# Patient Record
(demographics unavailable — no encounter records)

---

## 2018-12-02 NOTE — EKG
[place] [address]
Phone:  [phone]                     ELECTROCARDIOGRAM REPORT      
_______________________________________________________________________________
 
Name:       MARADIAGASHUEE               Room:                      [facility]#:  [id number]      Account #:      [id number]  
Admission:  18     Attend Phys:                         
Discharge:  18     Date of Birth:  78  
         Report #: [id number]
    [id number]
_______________________________________________________________________________
THIS REPORT FOR:  //name//                      
 
                         [facility] ED
                                       
Test Date:    2018               Test Time:    13:58:43
Pat Name:     SHU MARADIAGA                Department:   
Patient ID:   [id number]            Room:          
Gender:       F                        Technician:   [place]
:          1978               Requested By: Celia Miramontes
Order Number: [id number] MD:   Andre Gloria
                                 Measurements
Intervals                              Axis          
Rate:         83                       P:            27
ME:           150                      QRS:          18
QRSD:         94                       T:            -1
QT:           363                                    
QTc:          427                                    
                           Interpretive Statements
Sinus rhythm
Borderline T wave abnormalities
No previous ECG available for comparison
 
Electronically Signed On 2018 15:32:43 CST by Andre Gloria
[email or web address]
 
 
 
 
 
 
 
 
 
 
 
 
 
 
 
 
 
 
 
  <ELECTRONICALLY SIGNED>
                                           By: Andre Gloria MD, [facility]   
  18     1532
D: 18 1358   _____________________________________
T: 18 1358   Andre Gloria MD, FACC     /EPI

## 2021-01-02 NOTE — EKG
[place] [address]
Phone:  [phone]                     ELECTROCARDIOGRAM REPORT      
_______________________________________________________________________________
 
Name:         MARADIAGASHU ELY              Room:                     [facility]#:    [id number]     Account #:     [id number]  
Admission:    21    Attend Phys:                     
Discharge:    21    Date of Birth: 78  
Date of Service: 21  Report #:      [id number]
        [id number]
_______________________________________________________________________________
THIS REPORT FOR:  //name//                      
 
                         [facility] ED
                                       
Test Date:    2021               Test Time:    21:12:44
Pat Name:     HSU MARADIAGA                Department:   
Patient ID:   [id number]            Room:          
Gender:       F                        Technician:   
:          1978               Requested By: Renetta Rose
Order Number: [id number] MD:   Saroj Spencer
                                 Measurements
Intervals                              Axis          
Rate:         124                      P:            51
AK:           125                      QRS:          44
QRSD:         91                       T:            30
QT:           447                                    
QTc:          643                                    
                           Interpretive Statements
Sinus tachycardia
nonspecific t wave changes
Prolonged QT interval
Compared to ECG 2018 13:58:43
Prolonged QT interval now present
Sinus rhythm no longer present
Electronically Signed On 2021 9:42:15 CST by Saroj Spencer
[email or web address]
 
 
 
 
 
 
 
 
 
 
 
 
 
 
 
 
 
 
  <ELECTRONICALLY SIGNED>
                                           By: Saroj Spencer MD, FAC      
  21     0942
D: 21   _____________________________________
T: 21   Saroj Spencer MD, [facility]        /EPI

## 2023-06-13 NOTE — EKG
[place] [address]
Phone:  [phone]                     ELECTROCARDIOGRAM REPORT      
_______________________________________________________________________________
 
Name:         HIRENSHU ELY              Room:                     [facility]#:    [id number]     Account #:     [id number]  
Admission:    21    Attend Phys:                     
Discharge:    21    Date of Birth: 78  
Date of Service: 21  Report #:      [id number]
        [id number]
_______________________________________________________________________________
THIS REPORT FOR:  //name//                      
 
                         [facility] ED
                                       
Test Date:    2021               Test Time:    19:53:33
Pat Name:     SHU MARADIAGA                Department:   
Patient ID:   [id number]            Room:          
Gender:       F                        Technician:   [place]
:          1978               Requested By: Celia Miramontes
Order Number: [id number] MD:   Gabe Shah
                                 Measurements
Intervals                              Axis          
Rate:         113                      P:            44
SC:           138                      QRS:          17
QRSD:         98                       T:            -44
QT:           328                                    
QTc:          450                                    
                           Interpretive Statements
Sinus tachycardia
Multiple premature complexes, narrow and wide QRS
Borderline T abnormalities, inferior leads
Compared to ECG 2021 21:12:44
Prolonged QT interval no longer present
T-wave abnormality still present
Electronically Signed On 2021 13:46:40 CST by Gabe Shah
[email or web address]
 
 
 
 
 
 
 
 
 
 
 
 
 
 
 
 
 
 
  <ELECTRONICALLY SIGNED>
                                           By: Gabe Shah MD, FAC     
  21     1346
D: 21   _____________________________________
T: 21   Gabe Shah MD, FAC       /EPI
Detail Level: Detailed